# Patient Record
Sex: FEMALE | Race: WHITE | NOT HISPANIC OR LATINO | Employment: OTHER | URBAN - METROPOLITAN AREA
[De-identification: names, ages, dates, MRNs, and addresses within clinical notes are randomized per-mention and may not be internally consistent; named-entity substitution may affect disease eponyms.]

---

## 2022-09-20 PROBLEM — M62.82 NON-TRAUMATIC RHABDOMYOLYSIS: Status: ACTIVE | Noted: 2022-09-20

## 2022-09-20 PROBLEM — R79.89 ABNORMAL LFTS: Status: ACTIVE | Noted: 2022-09-20

## 2022-09-20 PROBLEM — F31.13 BIPOLAR AFFECTIVE DISORDER, MANIC, SEVERE: Status: ACTIVE | Noted: 2022-09-20

## 2022-09-20 PROBLEM — R03.0 ELEVATED BLOOD PRESSURE READING: Status: ACTIVE | Noted: 2022-09-20

## 2022-09-20 PROBLEM — F17.210 CIGARETTE NICOTINE DEPENDENCE WITHOUT COMPLICATION: Status: ACTIVE | Noted: 2022-09-20

## 2022-09-20 PROBLEM — E87.6 HYPOKALEMIA: Status: ACTIVE | Noted: 2022-09-20

## 2022-09-21 PROBLEM — F31.2 BIPOLAR AFFECTIVE DISORDER, MANIC, SEVERE, WITH PSYCHOTIC BEHAVIOR: Status: ACTIVE | Noted: 2022-09-20

## 2022-09-26 PROBLEM — M79.642 LEFT HAND PAIN: Status: ACTIVE | Noted: 2022-09-26

## 2022-09-27 PROBLEM — G47.33 OBSTRUCTIVE SLEEP APNEA: Chronic | Status: ACTIVE | Noted: 2022-09-27

## 2022-09-29 PROBLEM — F31.13 BIPOLAR AFFECTIVE DISORDER, MANIC, SEVERE: Chronic | Status: ACTIVE | Noted: 2022-09-20

## 2022-11-10 PROBLEM — F31.75 BIPOLAR DISORDER, IN PARTIAL REMISSION, MOST RECENT EPISODE DEPRESSED: Status: ACTIVE | Noted: 2022-09-20

## 2023-01-10 ENCOUNTER — NURSE TRIAGE (OUTPATIENT)
Dept: ADMINISTRATIVE | Facility: CLINIC | Age: 58
End: 2023-01-10

## 2023-01-10 NOTE — TELEPHONE ENCOUNTER
Patient is calling C/O severe hair loss starting in December. Appears to be an overall thinning of hair, no patches noticed. Patient wondering if it may be due to her medications. Triage done- dispo see provider in 2 weeks. She has appointment on 2/8 to establish care, will send message to see sooner if possible. Advised I would send message to Dr. Mendenhall re possible medication S/E and she will reach out to her non [facility]. Instructed to call back for any further questions or concerns or worsening S/S. Verb understanding.   Reason for Disposition   [1] Hair thinning, hair loss, or balding AND [2] cause not known (e.g., no recent precipitating factors such as childbirth, weight loss surgery)    Additional Information   Negative: Patient sounds very sick or weak to the triager   Negative: Head lice suspected (e.g., head itching and lice or nits are seen)   Negative: [1] Scalp is red and painful in area of hair loss AND [2] large (more than 1 inch; 2.5 cm)   Negative: [1] Scalp is red and painful in area of hair loss AND [2] small (less than 1 inch; 2.5 cm)   Negative: Scabs or crusts are present in the hair   Negative: Ringworm of the scalp suspected (round patch of hair loss with scales, rough surface, redness or itching)   Negative: [1] Recently diagnosed with ringworm AND [2] getting WORSE on treatment (e.g., increasing size, more spots)   Negative: Chemotherapy and questions about hair loss   Negative: Hair loss from nervous habit of twisting (or pulling) the hair   Negative: [1] Patch of hair loss AND [2] cause not known    Protocols used: Hair Loss-A-

## 2023-02-16 PROBLEM — F31.9: Status: ACTIVE | Noted: 2022-09-20

## 2023-02-16 PROBLEM — M79.642 LEFT HAND PAIN: Status: RESOLVED | Noted: 2022-09-26 | Resolved: 2023-02-16

## 2023-02-20 PROBLEM — E55.9 VITAMIN D DEFICIENCY: Status: ACTIVE | Noted: 2023-02-20

## 2023-02-20 PROBLEM — L65.9 ALOPECIA: Status: ACTIVE | Noted: 2023-02-20

## 2023-02-20 PROBLEM — R73.01 IMPAIRED FASTING GLUCOSE: Status: ACTIVE | Noted: 2023-02-20

## 2023-02-20 PROBLEM — R68.89 COLD INTOLERANCE: Status: ACTIVE | Noted: 2023-02-20

## 2023-02-24 ENCOUNTER — PATIENT MESSAGE (OUTPATIENT)
Dept: RESEARCH | Facility: HOSPITAL | Age: 58
End: 2023-02-24

## 2023-03-06 PROBLEM — F31.75 BIPOLAR DISORDER, IN PARTIAL REMISSION, MOST RECENT EPISODE DEPRESSED: Status: ACTIVE | Noted: 2023-03-06

## 2023-03-08 DIAGNOSIS — Z12.31 OTHER SCREENING MAMMOGRAM: ICD-10-CM

## 2023-03-10 ENCOUNTER — PATIENT MESSAGE (OUTPATIENT)
Dept: ADMINISTRATIVE | Facility: HOSPITAL | Age: 58
End: 2023-03-10

## 2023-03-17 ENCOUNTER — PATIENT MESSAGE (OUTPATIENT)
Dept: RESEARCH | Facility: HOSPITAL | Age: 58
End: 2023-03-17

## 2023-03-17 PROBLEM — L40.50 PSORIATIC ARTHRITIS: Status: ACTIVE | Noted: 2023-03-17

## 2023-03-19 PROBLEM — M62.82 NON-TRAUMATIC RHABDOMYOLYSIS: Status: RESOLVED | Noted: 2022-09-20 | Resolved: 2023-03-19

## 2023-03-19 PROBLEM — K65.9 PERITONITIS, UNSPECIFIED: Status: ACTIVE | Noted: 2023-03-19

## 2023-03-19 PROBLEM — R03.0 ELEVATED BLOOD PRESSURE READING: Status: RESOLVED | Noted: 2022-09-20 | Resolved: 2023-03-19

## 2023-03-19 PROBLEM — F31.75 BIPOLAR DISORDER, IN PARTIAL REMISSION, MOST RECENT EPISODE DEPRESSED: Status: RESOLVED | Noted: 2023-03-06 | Resolved: 2023-03-19

## 2023-05-27 PROBLEM — E03.9 ACQUIRED HYPOTHYROIDISM: Status: ACTIVE | Noted: 2023-05-27

## 2023-05-27 PROBLEM — K80.42 CALCULUS OF BILE DUCT WITH ACUTE CHOLECYSTITIS WITHOUT OBSTRUCTION: Status: ACTIVE | Noted: 2023-05-27

## 2023-06-28 PROBLEM — K80.42 CALCULUS OF BILE DUCT WITH ACUTE CHOLECYSTITIS WITHOUT OBSTRUCTION: Status: RESOLVED | Noted: 2023-05-27 | Resolved: 2023-06-28

## 2023-06-28 PROBLEM — K65.9 PERITONITIS, UNSPECIFIED: Status: RESOLVED | Noted: 2023-03-19 | Resolved: 2023-06-28

## 2023-06-28 PROBLEM — F31.9: Status: RESOLVED | Noted: 2022-09-20 | Resolved: 2023-06-28

## 2023-06-28 PROBLEM — R68.89 COLD INTOLERANCE: Status: RESOLVED | Noted: 2023-02-20 | Resolved: 2023-06-28
